# Patient Record
Sex: MALE | Race: WHITE | NOT HISPANIC OR LATINO | URBAN - METROPOLITAN AREA
[De-identification: names, ages, dates, MRNs, and addresses within clinical notes are randomized per-mention and may not be internally consistent; named-entity substitution may affect disease eponyms.]

---

## 2018-08-26 ENCOUNTER — INPATIENT (INPATIENT)
Facility: HOSPITAL | Age: 58
LOS: 1 days | Discharge: HOME | End: 2018-08-28
Attending: FAMILY MEDICINE | Admitting: FAMILY MEDICINE
Payer: COMMERCIAL

## 2018-08-26 VITALS — HEIGHT: 67 IN | WEIGHT: 240.08 LBS

## 2018-08-26 DIAGNOSIS — Z90.79 ACQUIRED ABSENCE OF OTHER GENITAL ORGAN(S): Chronic | ICD-10-CM

## 2018-08-26 LAB
ALBUMIN SERPL ELPH-MCNC: 3.9 G/DL — SIGNIFICANT CHANGE UP (ref 3.3–5)
ALP SERPL-CCNC: 59 U/L — SIGNIFICANT CHANGE UP (ref 40–120)
ALT FLD-CCNC: 33 U/L — SIGNIFICANT CHANGE UP (ref 12–78)
ANION GAP SERPL CALC-SCNC: 7 MMOL/L — SIGNIFICANT CHANGE UP (ref 5–17)
AST SERPL-CCNC: 22 U/L — SIGNIFICANT CHANGE UP (ref 15–37)
BILIRUB SERPL-MCNC: 0.6 MG/DL — SIGNIFICANT CHANGE UP (ref 0.2–1.2)
BUN SERPL-MCNC: 24 MG/DL — HIGH (ref 7–23)
CALCIUM SERPL-MCNC: 9.1 MG/DL — SIGNIFICANT CHANGE UP (ref 8.5–10.1)
CHLORIDE SERPL-SCNC: 106 MMOL/L — SIGNIFICANT CHANGE UP (ref 96–108)
CO2 SERPL-SCNC: 27 MMOL/L — SIGNIFICANT CHANGE UP (ref 22–31)
CREAT SERPL-MCNC: 1.29 MG/DL — SIGNIFICANT CHANGE UP (ref 0.5–1.3)
D DIMER BLD IA.RAPID-MCNC: <150 NG/ML DDU — SIGNIFICANT CHANGE UP
GLUCOSE SERPL-MCNC: 91 MG/DL — SIGNIFICANT CHANGE UP (ref 70–99)
HCT VFR BLD CALC: 46 % — SIGNIFICANT CHANGE UP (ref 39–50)
HGB BLD-MCNC: 15.8 G/DL — SIGNIFICANT CHANGE UP (ref 13–17)
INR BLD: 1.04 RATIO — SIGNIFICANT CHANGE UP (ref 0.88–1.16)
MCHC RBC-ENTMCNC: 30.9 PG — SIGNIFICANT CHANGE UP (ref 27–34)
MCHC RBC-ENTMCNC: 34.3 GM/DL — SIGNIFICANT CHANGE UP (ref 32–36)
MCV RBC AUTO: 90 FL — SIGNIFICANT CHANGE UP (ref 80–100)
NRBC # BLD: 0 /100 WBCS — SIGNIFICANT CHANGE UP (ref 0–0)
PLATELET # BLD AUTO: 206 K/UL — SIGNIFICANT CHANGE UP (ref 150–400)
POTASSIUM SERPL-MCNC: 4.4 MMOL/L — SIGNIFICANT CHANGE UP (ref 3.5–5.3)
POTASSIUM SERPL-SCNC: 4.4 MMOL/L — SIGNIFICANT CHANGE UP (ref 3.5–5.3)
PROT SERPL-MCNC: 7.5 GM/DL — SIGNIFICANT CHANGE UP (ref 6–8.3)
PROTHROM AB SERPL-ACNC: 11.2 SEC — SIGNIFICANT CHANGE UP (ref 9.8–12.7)
RBC # BLD: 5.11 M/UL — SIGNIFICANT CHANGE UP (ref 4.2–5.8)
RBC # FLD: 12 % — SIGNIFICANT CHANGE UP (ref 10.3–14.5)
SODIUM SERPL-SCNC: 140 MMOL/L — SIGNIFICANT CHANGE UP (ref 135–145)
TROPONIN I SERPL-MCNC: 0.17 NG/ML — HIGH (ref 0.01–0.04)
WBC # BLD: 7.21 K/UL — SIGNIFICANT CHANGE UP (ref 3.8–10.5)
WBC # FLD AUTO: 7.21 K/UL — SIGNIFICANT CHANGE UP (ref 3.8–10.5)

## 2018-08-26 PROCEDURE — 71045 X-RAY EXAM CHEST 1 VIEW: CPT | Mod: 26

## 2018-08-26 PROCEDURE — 93010 ELECTROCARDIOGRAM REPORT: CPT

## 2018-08-26 PROCEDURE — 99285 EMERGENCY DEPT VISIT HI MDM: CPT

## 2018-08-26 RX ORDER — METOPROLOL TARTRATE 50 MG
25 TABLET ORAL DAILY
Qty: 0 | Refills: 0 | Status: DISCONTINUED | OUTPATIENT
Start: 2018-08-26 | End: 2018-08-28

## 2018-08-26 RX ORDER — ATORVASTATIN CALCIUM 80 MG/1
40 TABLET, FILM COATED ORAL AT BEDTIME
Qty: 0 | Refills: 0 | Status: DISCONTINUED | OUTPATIENT
Start: 2018-08-26 | End: 2018-08-28

## 2018-08-26 RX ORDER — CLOPIDOGREL BISULFATE 75 MG/1
75 TABLET, FILM COATED ORAL DAILY
Qty: 0 | Refills: 0 | Status: DISCONTINUED | OUTPATIENT
Start: 2018-08-27 | End: 2018-08-28

## 2018-08-26 RX ORDER — CLOPIDOGREL BISULFATE 75 MG/1
300 TABLET, FILM COATED ORAL ONCE
Qty: 0 | Refills: 0 | Status: COMPLETED | OUTPATIENT
Start: 2018-08-26 | End: 2018-08-26

## 2018-08-26 RX ORDER — SODIUM CHLORIDE 9 MG/ML
1000 INJECTION INTRAMUSCULAR; INTRAVENOUS; SUBCUTANEOUS
Qty: 0 | Refills: 0 | Status: DISCONTINUED | OUTPATIENT
Start: 2018-08-26 | End: 2018-08-28

## 2018-08-26 RX ORDER — THYROID 120 MG
1 TABLET ORAL
Qty: 0 | Refills: 0 | COMMUNITY

## 2018-08-26 RX ORDER — ASPIRIN/CALCIUM CARB/MAGNESIUM 324 MG
81 TABLET ORAL DAILY
Qty: 0 | Refills: 0 | Status: DISCONTINUED | OUTPATIENT
Start: 2018-08-27 | End: 2018-08-28

## 2018-08-26 RX ORDER — ASPIRIN/CALCIUM CARB/MAGNESIUM 324 MG
162 TABLET ORAL ONCE
Qty: 0 | Refills: 0 | Status: COMPLETED | OUTPATIENT
Start: 2018-08-26 | End: 2018-08-26

## 2018-08-26 RX ORDER — ACETAMINOPHEN 500 MG
650 TABLET ORAL EVERY 6 HOURS
Qty: 0 | Refills: 0 | Status: DISCONTINUED | OUTPATIENT
Start: 2018-08-26 | End: 2018-08-28

## 2018-08-26 RX ORDER — ESCITALOPRAM OXALATE 10 MG/1
1 TABLET, FILM COATED ORAL
Qty: 0 | Refills: 0 | COMMUNITY

## 2018-08-26 RX ADMIN — SODIUM CHLORIDE 75 MILLILITER(S): 9 INJECTION INTRAMUSCULAR; INTRAVENOUS; SUBCUTANEOUS at 23:47

## 2018-08-26 RX ADMIN — CLOPIDOGREL BISULFATE 300 MILLIGRAM(S): 75 TABLET, FILM COATED ORAL at 21:18

## 2018-08-26 RX ADMIN — Medication 25 MILLIGRAM(S): at 21:18

## 2018-08-26 RX ADMIN — ATORVASTATIN CALCIUM 40 MILLIGRAM(S): 80 TABLET, FILM COATED ORAL at 23:48

## 2018-08-26 RX ADMIN — Medication 162 MILLIGRAM(S): at 19:38

## 2018-08-26 NOTE — ED PROVIDER NOTE - CARDIAC, MLM
Normal rate, regular rhythm.  Heart sounds S1, S2.  No murmurs, rubs or gallops. BL symmetrical radial pulses

## 2018-08-26 NOTE — H&P ADULT - NSHPREVIEWOFSYSTEMS_GEN_ALL_CORE
REVIEW OF SYSTEMS:    CONSTITUTIONAL: No weakness, fevers or chills  EYES/ENT: No visual changes;  No vertigo or throat pain   NECK: No pain or stiffness  RESPIRATORY: No cough, wheezing, hemoptysis; No shortness of breath  CARDIOVASCULAR: + chest pain, no  palpitations  GASTROINTESTINAL: No abdominal or epigastric pain. No nausea, vomiting, or hematemesis; No diarrhea or constipation. No melena or hematochezia.  GENITOURINARY: No dysuria, frequency or hematuria  NEUROLOGICAL: No numbness or weakness  SKIN: No itching, burning, rashes, or lesions   All other review of systems is negative unless indicated above.

## 2018-08-26 NOTE — H&P ADULT - ASSESSMENT
58 y/o male with PMHx of HTN, low testosterone level, BPH presenting to the  with  c/o intermittent episodes of CP. Patient reports substernal CP which was happening when he was walking the dog, pressure like, radiating to the left arm,  on and off for last 4 days, relived by rest.  Chest pain was lasted 20 minutes first but today was 2 hour.  Patient decided to go to ED.  Notes that sx began 8/23 while he was walking with his dog, sx returned the next day while walking the dog, sx returned again today while sitting at his computer.  Sx today lasted 2 hours.  Denies dizziness, palpitations, headaches, nausea, vomiting, abdominal pain, numbness or weakness. He did not seen PCP in 5-6 years ago, at [facility]. Went to urgent care where EKG was performed and was sent to ED, given 2 ASA at [facility].  Patient reports taking advil for pain with some relief.      In ED -  T(F): 98.4 , Max: 98.4 HR: 75  (60 - 75) BP: 164/102 (146/89 - 164/104) RR: 16  (16 - 18) SpO2: 99%  (98% - 100%) EKG - sinus 58, T wave inversion in III, flat T wave in AVF  troponin 0.166  Cr 1.29, BUN 24, d-dimers neg s/p ASA, plavix load     * Chest pain suspected NSTEMI   - tele  - serial troponin, EKG  - 2 d echo  - check lipid profile, TSH, A1C  - cardiology consult  - DAPT, statins, BB  - possible cath tomorrow  - NPO from midnight     * HTN, uncontrolled  - BB   - monitor  - may need addition of ACEi  if renal function improves     * SOILA  - IV fluids  - repeat Cr in am    DVT proph - IPC

## 2018-08-26 NOTE — ED ADULT NURSE NOTE - NSIMPLEMENTINTERV_GEN_ALL_ED
Implemented All Universal Safety Interventions:  [place] to call system. Call bell, personal items and telephone within reach. Instruct patient to call for assistance. Room bathroom lighting operational. Non-slip footwear when patient is off stretcher. Physically safe environment: no spills, clutter or unnecessary equipment. Stretcher in lowest position, wheels locked, appropriate side rails in place.

## 2018-08-26 NOTE — ED STATDOCS - PROGRESS NOTE DETAILS
Raymond ROMAN for ED Attending Dr. Balbuena: 56 y/o male with PMHx of presents to the ED c/o CP described as tightness x4 days. +numbness radiating down LUE. SX resolved in ED. Notes that sx began while he was walking with his dog. Last f/u with PCP 5-6 years ago. Follows with Leroy castaneda. Went to urgent care where EKG was performed and was sent to ED. Will send pt to the Main ED for further evaluation.

## 2018-08-26 NOTE — ED PROVIDER NOTE - OBJECTIVE STATEMENT
58 y/o male with no PMHx presenting to the ED c/o intermittent episodes of CP described as tightness, with mild SOB x4 days. Sx resolved in ED, currently asymptomatic. Notes that sx began 8/23 while he was walking with his dog, sx returned the next day while walking the dog, sx returned again today while sitting at his computer. Sx today lasted approx 1-2 hours. Denies dizziness, n/v. Last f/u with PCP 5-6 years ago. Follows with Leroy castaneda. Went to urgent care where EKG was performed and was sent to ED, given 2 ASA at [facility]. Travelled to [place] this past weekend. States ASA relieves sx. Nonsmoker. FHx of mother with stents placed in late 50s.

## 2018-08-26 NOTE — ED ADULT NURSE REASSESSMENT NOTE - NSIMPLEMENTINTERV_GEN_ALL_ED
Implemented All Universal Safety Interventions:  [place] to call system. Call bell, personal items and telephone within reach. Instruct patient to call for assistance. Room bathroom lighting operational. Non-slip footwear when patient is off stretcher. Physically safe environment: no spills, clutter or unnecessary equipment. Stretcher in lowest position, wheels locked, appropriate side rails in place.

## 2018-08-26 NOTE — ED ADULT NURSE REASSESSMENT NOTE - NS ED NURSE REASSESS COMMENT FT1
Assumed care of patient from RAJINDER Cowan. Patient resting comfortably in bed. Safety and comfort maintained. Will continue to monitor.

## 2018-08-26 NOTE — H&P ADULT - HISTORY OF PRESENT ILLNESS
56 y/o male with PMHx of HTN, low testosterone level, BPH presenting to the  with  c/o intermittent episodes of CP. Patient reports substernal CP which was happening when he was walking the dog, pressure like, radiating to the left arm,  on and off for last 4 days, relived by rest.  Chest pain was lasted 20 minutes first but today was 2 hour.  Patient decided to go to ED.  Notes that sx began 8/23 while he was walking with his dog, sx returned the next day while walking the dog, sx returned again today while sitting at his computer.  Sx today lasted 2 hours.  Denies dizziness, palpitations, headaches, nausea, vomiting, abdominal pain, numbness or weakness. He did not seen PCP in 5-6 years ago, at [facility]. Went to urgent care where EKG was performed and was sent to ED, given 2 ASA at [facility].  Patient reports taking advil for pain with some relief.      In ED -  T(F): 98.4 , Max: 98.4 HR: 75  (60 - 75) BP: 164/102 (146/89 - 164/104) RR: 16  (16 - 18) SpO2: 99%  (98% - 100%) EKG - sinus 58, T wave inversion in III, flat T wave in AVF  troponin 0.166  Cr 1.29, BUN 24, d-dimers neg s/p ASA, plavix load

## 2018-08-26 NOTE — PATIENT PROFILE ADULT. - NS TRANSFER PATIENT BELONGINGS
Electronic Device (specify)/lap top/Clothing Clothing/Electronic Device (specify)/lap top [employer] think pad and , [employer] android, , laptop bag

## 2018-08-26 NOTE — PATIENT PROFILE ADULT. - TEACHING/LEARNING LEARNING PREFERENCES
computer/internet/individual instruction/verbal instruction/skill demonstration/video/written material

## 2018-08-27 LAB
ANION GAP SERPL CALC-SCNC: 7 MMOL/L — SIGNIFICANT CHANGE UP (ref 5–17)
BASOPHILS # BLD AUTO: 0.02 K/UL — SIGNIFICANT CHANGE UP (ref 0–0.2)
BASOPHILS NFR BLD AUTO: 0.3 % — SIGNIFICANT CHANGE UP (ref 0–2)
BLD GP AB SCN SERPL QL: SIGNIFICANT CHANGE UP
BUN SERPL-MCNC: 19 MG/DL — SIGNIFICANT CHANGE UP (ref 7–23)
CALCIUM SERPL-MCNC: 8.5 MG/DL — SIGNIFICANT CHANGE UP (ref 8.5–10.1)
CHLORIDE SERPL-SCNC: 106 MMOL/L — SIGNIFICANT CHANGE UP (ref 96–108)
CHOLEST SERPL-MCNC: 214 MG/DL — HIGH (ref 10–199)
CK SERPL-CCNC: 109 U/L — SIGNIFICANT CHANGE UP (ref 26–308)
CK SERPL-CCNC: 123 U/L — SIGNIFICANT CHANGE UP (ref 26–308)
CO2 SERPL-SCNC: 26 MMOL/L — SIGNIFICANT CHANGE UP (ref 22–31)
CREAT SERPL-MCNC: 1.15 MG/DL — SIGNIFICANT CHANGE UP (ref 0.5–1.3)
EOSINOPHIL # BLD AUTO: 0.12 K/UL — SIGNIFICANT CHANGE UP (ref 0–0.5)
EOSINOPHIL NFR BLD AUTO: 1.9 % — SIGNIFICANT CHANGE UP (ref 0–6)
GLUCOSE SERPL-MCNC: 97 MG/DL — SIGNIFICANT CHANGE UP (ref 70–99)
HBA1C BLD-MCNC: 5.5 % — SIGNIFICANT CHANGE UP (ref 4–5.6)
HCT VFR BLD CALC: 46.3 % — SIGNIFICANT CHANGE UP (ref 39–50)
HDLC SERPL-MCNC: 35 MG/DL — LOW
HGB BLD-MCNC: 16.1 G/DL — SIGNIFICANT CHANGE UP (ref 13–17)
IMM GRANULOCYTES NFR BLD AUTO: 0.3 % — SIGNIFICANT CHANGE UP (ref 0–1.5)
LIPID PNL WITH DIRECT LDL SERPL: 140 MG/DL — HIGH
LYMPHOCYTES # BLD AUTO: 1.98 K/UL — SIGNIFICANT CHANGE UP (ref 1–3.3)
LYMPHOCYTES # BLD AUTO: 31.2 % — SIGNIFICANT CHANGE UP (ref 13–44)
MCHC RBC-ENTMCNC: 31 PG — SIGNIFICANT CHANGE UP (ref 27–34)
MCHC RBC-ENTMCNC: 34.8 GM/DL — SIGNIFICANT CHANGE UP (ref 32–36)
MCV RBC AUTO: 89.2 FL — SIGNIFICANT CHANGE UP (ref 80–100)
MONOCYTES # BLD AUTO: 0.59 K/UL — SIGNIFICANT CHANGE UP (ref 0–0.9)
MONOCYTES NFR BLD AUTO: 9.3 % — SIGNIFICANT CHANGE UP (ref 2–14)
NEUTROPHILS # BLD AUTO: 3.61 K/UL — SIGNIFICANT CHANGE UP (ref 1.8–7.4)
NEUTROPHILS NFR BLD AUTO: 57 % — SIGNIFICANT CHANGE UP (ref 43–77)
NRBC # BLD: 0 /100 WBCS — SIGNIFICANT CHANGE UP (ref 0–0)
NT-PROBNP SERPL-SCNC: 100 PG/ML — SIGNIFICANT CHANGE UP (ref 0–125)
PLATELET # BLD AUTO: 199 K/UL — SIGNIFICANT CHANGE UP (ref 150–400)
POTASSIUM SERPL-MCNC: 3.9 MMOL/L — SIGNIFICANT CHANGE UP (ref 3.5–5.3)
POTASSIUM SERPL-SCNC: 3.9 MMOL/L — SIGNIFICANT CHANGE UP (ref 3.5–5.3)
RBC # BLD: 5.19 M/UL — SIGNIFICANT CHANGE UP (ref 4.2–5.8)
RBC # FLD: 12.1 % — SIGNIFICANT CHANGE UP (ref 10.3–14.5)
SODIUM SERPL-SCNC: 139 MMOL/L — SIGNIFICANT CHANGE UP (ref 135–145)
TOTAL CHOLESTEROL/HDL RATIO MEASUREMENT: 6.1 RATIO — SIGNIFICANT CHANGE UP (ref 3.4–9.6)
TRIGL SERPL-MCNC: 197 MG/DL — HIGH (ref 10–149)
TROPONIN I SERPL-MCNC: 0.13 NG/ML — HIGH (ref 0.01–0.04)
TROPONIN I SERPL-MCNC: 0.18 NG/ML — HIGH (ref 0.01–0.04)
TSH SERPL-MCNC: 2.61 UU/ML — SIGNIFICANT CHANGE UP (ref 0.34–4.82)
TYPE + AB SCN PNL BLD: SIGNIFICANT CHANGE UP
WBC # BLD: 6.34 K/UL — SIGNIFICANT CHANGE UP (ref 3.8–10.5)
WBC # FLD AUTO: 6.34 K/UL — SIGNIFICANT CHANGE UP (ref 3.8–10.5)

## 2018-08-27 PROCEDURE — 93010 ELECTROCARDIOGRAM REPORT: CPT | Mod: 76

## 2018-08-27 PROCEDURE — 93306 TTE W/DOPPLER COMPLETE: CPT | Mod: 26

## 2018-08-27 RX ORDER — SODIUM CHLORIDE 9 MG/ML
1000 INJECTION INTRAMUSCULAR; INTRAVENOUS; SUBCUTANEOUS
Qty: 0 | Refills: 0 | Status: COMPLETED | OUTPATIENT
Start: 2018-08-27 | End: 2018-08-27

## 2018-08-27 RX ADMIN — SODIUM CHLORIDE 100 MILLILITER(S): 9 INJECTION INTRAMUSCULAR; INTRAVENOUS; SUBCUTANEOUS at 13:33

## 2018-08-27 RX ADMIN — SODIUM CHLORIDE 100 MILLILITER(S): 9 INJECTION INTRAMUSCULAR; INTRAVENOUS; SUBCUTANEOUS at 22:41

## 2018-08-27 RX ADMIN — Medication 81 MILLIGRAM(S): at 13:32

## 2018-08-27 RX ADMIN — ATORVASTATIN CALCIUM 40 MILLIGRAM(S): 80 TABLET, FILM COATED ORAL at 22:42

## 2018-08-27 RX ADMIN — CLOPIDOGREL BISULFATE 75 MILLIGRAM(S): 75 TABLET, FILM COATED ORAL at 13:32

## 2018-08-27 RX ADMIN — Medication 650 MILLIGRAM(S): at 11:08

## 2018-08-27 NOTE — PROGRESS NOTE ADULT - SUBJECTIVE AND OBJECTIVE BOX
Cardiology NP     Patient is a 57y old  Male who presents with a chief complaint of left sided chest pain (26 Aug 2018 23:47)      HPI:  56 y/o male with PMHx of HTN, low testosterone level, BPH presenting to the  with  c/o intermittent episodes of CP. Patient reports substernal CP which was happening when he was walking the dog, pressure like, radiating to the left arm,  on and off for last 4 days, relived by rest.  troponin 0.166  Cr 1.29,  s/p ASA, plavix load (26 Aug 2018 23:47)      PAST MEDICAL & SURGICAL HISTORY:  Low testosterone in male  Enlarged prostate  HTN (hypertension)  No pertinent past medical history  H/O prostatectomy      MEDICATIONS  (STANDING):  aspirin enteric coated 81 milliGRAM(s) Oral daily  atorvastatin 40 milliGRAM(s) Oral at bedtime  clopidogrel Tablet 75 milliGRAM(s) Oral daily  metoprolol succinate ER 25 milliGRAM(s) Oral daily  sodium chloride 0.9%. 1000 milliLiter(s) (75 mL/Hr) IV Continuous <Continuous>  sodium chloride 0.9%. 1000 milliLiter(s) (100 mL/Hr) IV Continuous <Continuous>    MEDICATIONS  (PRN):  acetaminophen   Tablet. 650 milliGRAM(s) Oral every 6 hours PRN Mild Pain (1 - 3)      Allergies    No Known Allergies    REVIEW OF SYSTEMS: As mentioned in HPI all others Negative     Vital Signs Last 24 Hrs  T(C): 36.2 (27 Aug 2018 11:25), Max: 37.1 (26 Aug 2018 23:40)  T(F): 97.2 (27 Aug 2018 11:25), Max: 98.8 (26 Aug 2018 23:40)  HR: 58 (27 Aug 2018 11:25) (58 - 75)  BP: 167/93 (27 Aug 2018 11:25) (131/78 - 167/93)  BP(mean): --  RR: 16 (27 Aug 2018 11:25) (16 - 18)  SpO2: 97% (27 Aug 2018 11:25) (95% - 100%)    PHYSICAL EXAM:  NERVOUS SYSTEM:  Alert & Oriented X3, Good concentration  CHEST/LUNG: Clear to auscultation bilaterally; No rales, rhonchi, wheezing, or rubs  HEART: Regular rate and rhythm; No murmurs, rubs, or gallops  ABDOMEN: Soft, Nontender, Nondistended; Bowel sounds present  EXTREMITIES:  2+ Peripheral Pulses, No clubbing, cyanosis, or edema  SKIN: right femoral cath site with Perclose device, site without bleeding or hematoma    LABS:                        16.1   6.34  )-----------( 199      ( 27 Aug 2018 07:20 )             46.3     08-27    139  |  106  |  19  ----------------------------<  97  3.9   |  26  |  1.15    Ca    8.5      27 Aug 2018 07:20    TPro  7.5  /  Alb  3.9  /  TBili  0.6  /  DBili  x   /  AST  22  /  ALT  33  /  AlkPhos  59  08-26    PT/INR - ( 26 Aug 2018 18:48 )   PT: 11.2 sec;   INR: 1.04 ratio

## 2018-08-27 NOTE — PACU DISCHARGE NOTE - COMMENTS
Report given to Sara ontiveros on cicu. right groin site tegaderm dsg clean dry and intact, no bleeding or hematoma. Pt denies pain on discomfort. safety maintained

## 2018-08-27 NOTE — CONSULT NOTE ADULT - SUBJECTIVE AND OBJECTIVE BOX
CHIEF COMPLAINT: Patient is a 57y old  Male who presents with a chief complaint of left sided chest pain (26 Aug 2018 23:47)      HPI:  58 y/o male with PMHx of HTN, low testosterone level, BPH presenting to the  with  c/o intermittent episodes of CP. Patient reports substernal CP which was happening when he was walking the dog, pressure like, radiating to the left arm,  on and off for last 4 days, relived by rest.  Chest pain was lasted 20 minutes first but today was 2 hour.  Patient decided to go to ED.  Notes that sx began 8/23 while he was walking with his dog, sx returned the next day while walking the dog, sx returned again today while sitting at his computer.  Sx today lasted 2 hours.  Denies dizziness, palpitations, headaches, nausea, vomiting, abdominal pain, numbness or weakness. He did not seen PCP in 5-6 years ago, at [facility]. Went to urgent care where EKG was performed and was sent to ED, given 2 ASA at [facility].  Patient reports taking advil for pain with some relief.      In ED -  T(F): 98.4 , Max: 98.4 HR: 75  (60 - 75) BP: 164/102 (146/89 - 164/104) RR: 16  (16 - 18) SpO2: 99%  (98% - 100%) EKG - sinus 58, T wave inversion in III, flat T wave in AVF  troponin 0.166  Cr 1.29, BUN 24, d-dimers neg s/p ASA, plavix load (26 Aug 2018 23:47)    8/27-patient with unstable angina; minimal troponin elevation.  Feeling ok in hospital    PMHx: PAST MEDICAL & SURGICAL HISTORY:  Low testosterone in male  Enlarged prostate  HTN (hypertension)  H/O prostatectomy        Soc Hx:       Allergies: Allergies    No Known Allergies    Intolerances          REVIEW OF SYSTEMS:    CONSTITUTIONAL: No weakness, fevers or chills  EYES/ENT: No visual changes;  No vertigo or throat pain   NECK: No pain or stiffness  RESPIRATORY: No cough, wheezing, hemoptysis; No shortness of breath  CARDIOVASCULAR: chest pain or palpitations  GASTROINTESTINAL: No abdominal or epigastric pain. No nausea, vomiting, or hematemesis; No diarrhea or constipation. No melena or hematochezia.  GENITOURINARY: No dysuria, frequency or hematuria  NEUROLOGICAL: No numbness or weakness  SKIN: No itching, burning, rashes, or lesions   All other review of systems is negative unless indicated above    Vital Signs Last 24 Hrs  T(C): 36.6 (27 Aug 2018 04:32), Max: 37.1 (26 Aug 2018 23:40)  T(F): 97.8 (27 Aug 2018 04:32), Max: 98.8 (26 Aug 2018 23:40)  HR: 65 (27 Aug 2018 04:32) (60 - 75)  BP: 131/78 (27 Aug 2018 04:32) (131/78 - 164/104)  BP(mean): --  RR: 16 (26 Aug 2018 23:40) (16 - 18)  SpO2: 95% (27 Aug 2018 04:32) (95% - 100%)    I&O's Summary      CAPILLARY BLOOD GLUCOSE          PHYSICAL EXAM:   Constitutional: NAD, awake and alert, well-developed  HEENT: PERR, EOMI, Normal Hearing, MMM  Neck: Soft and supple, No LAD, No JVD  Respiratory: Breath sounds are clear bilaterally, No wheezing, rales or rhonchi  Cardiovascular: S1 and S2, regular rate and rhythm, no Murmurs, gallops or rubs  Gastrointestinal: Bowel Sounds present, soft, nontender, nondistended, no guarding, no rebound  Extremities: No peripheral edema  Vascular: 2+ peripheral pulses  Neurological: A/O x 3, no focal deficits  Musculoskeletal: 5/5 strength b/l upper and lower extremities  Skin: No rashes    MEDICATIONS:  MEDICATIONS  (STANDING):  aspirin enteric coated 81 milliGRAM(s) Oral daily  atorvastatin 40 milliGRAM(s) Oral at bedtime  clopidogrel Tablet 75 milliGRAM(s) Oral daily  metoprolol succinate ER 25 milliGRAM(s) Oral daily  sodium chloride 0.9%. 1000 milliLiter(s) (75 mL/Hr) IV Continuous <Continuous>      LABS: All Labs Reviewed:                        16.1   6.34  )-----------( 199      ( 27 Aug 2018 07:20 )             46.3     08-27    139  |  106  |  19  ----------------------------<  97  3.9   |  26  |  1.15    Ca    8.5      27 Aug 2018 07:20    TPro  7.5  /  Alb  3.9  /  TBili  0.6  /  DBili  x   /  AST  22  /  ALT  33  /  AlkPhos  59  08-26    PT/INR - ( 26 Aug 2018 18:48 )   PT: 11.2 sec;   INR: 1.04 ratio           CARDIAC MARKERS ( 27 Aug 2018 07:20 )  0.127 ng/mL / x     / 109 U/L / x     / x      CARDIAC MARKERS ( 26 Aug 2018 23:29 )  0.181 ng/mL / x     / 123 U/L / x     / x      CARDIAC MARKERS ( 26 Aug 2018 18:48 )  0.166 ng/mL / x     / x     / x     / x          Serum Pro-Brain Natriuretic Peptide: 100 pg/mL (08-27 @ 07:20)    Blood Culture:   lipid profile       RADIOLOGY:    EKG:  sinus bradycardia, st changes in I, aVL, T inversions in III, aVF    Telemetry:    ECHO:

## 2018-08-27 NOTE — CONSULT NOTE ADULT - ASSESSMENT
patient with angina, elevated troponins and abnl EKG  1-ASA/Plavix with load.  Statin therapy.  Going for angiography  2-If BP elevated will need either ACE-I or beta blockers-very low dose if BP runs high; may be due to anxiety of cath

## 2018-08-27 NOTE — PROGRESS NOTE ADULT - ASSESSMENT
HPI:  56 y/o male with PMHx of HTN, low testosterone level, BPH presenting to the  with  c/o intermittent episodes of CP. Patient reports substernal CP which was happening when he was walking the dog, pressure like, radiating to the left arm,  on and off for last 4 days, relived by rest.  Chest pain was lasted 20 minutes first but today was 2 hour.   + troponin 0.166   BUN 24,  s/p ASA, plavix load (26 Aug 2018 23:47)      Patient now s/p angiogram  with PCI and PASHA to the RCA    - monitor in CICU  - IV hydration  - AM labs and EKG  - procedure, outcome and f/u care reviewed with patient/MD  - continue ASA/ Plavix  - continue statin/ B' blocker   - possible DC home tomorrow if stable  - f/u with MD in 4-7 days

## 2018-08-27 NOTE — PROGRESS NOTE ADULT - SUBJECTIVE AND OBJECTIVE BOX
56 y/o male with PMHx of HTN, low testosterone level, BPH presenting to the  with  c/o intermittent episodes of CP. Patient reports substernal CP which was happening when he was walking the dog, pressure like, radiating to the left arm,  on and off for last 4 days, relived by rest.  Chest pain was lasted 20 minutes first but today was 2 hour.  Patient decided to go to ED.  Notes that sx began 8/23 while he was walking with his dog, sx returned the next day while walking the dog, sx returned again today while sitting at his computer.  Sx today lasted 2 hours.  Denies dizziness, palpitations, headaches, nausea, vomiting, abdominal pain, numbness or weakness. He did not seen PCP in 5-6 years ago, at [facility]. Went to urgent care where EKG was performed and was sent to ED, given 2 ASA at [facility].  Patient reports taking advil for pain with some relief.      8.27: s/p cardiac cath and PCI of RCA, no cp, no sob            REVIEW OF SYSTEMS:    CONSTITUTIONAL: No weakness, No fevers or chills  ENT: No ear ache, No sorethroat  NECK: No pain, No stiffness  RESPIRATORY: No cough, No wheezing, No hemoptysis; No dyspnea  CARDIOVASCULAR: No chest pain, No palpitations  GASTROINTESTINAL: No abd pain, No nausea, No vomiting, No hematemesis, No diarrhea or constipation. No melena, No hematochezia.  GENITOURINARY: No dysuria, No  hematuria  NEUROLOGICAL: No diplopia, No paresthesia, No motor dysfunction  MUSCULOSKELETAL: No arthralgia, No myalgia  SKIN: No rashes, or lesions   PSYCH: no anxiety, no suicidal ideation    All other review of systems is negative unless indicated above    Vital Signs Last 24 Hrs  T(C): 36.2 (27 Aug 2018 11:25), Max: 37.1 (26 Aug 2018 23:40)  T(F): 97.2 (27 Aug 2018 11:25), Max: 98.8 (26 Aug 2018 23:40)  HR: 65 (27 Aug 2018 15:35) (53 - 75)  BP: 150/- (27 Aug 2018 15:35) (131/78 - 167/93)  BP(mean): --  RR: 16 (27 Aug 2018 15:35) (16 - 18)  SpO2: 97% (27 Aug 2018 15:35) (95% - 100%)    PHYSICAL EXAM:    GENERAL: NAD, Well nourished  HEENT:  NC/AT, EOMI, PERRLA, No scleral icterus, Moist mucous membranes  NECK: Supple, No JVD  CNS:  Alert & Oriented X3, Motor Strength 5/5 B/L upper and lower extremities; DTRs 2+ intact   LUNG: Normal Breath sounds, Clear to auscultation bilaterally, No rales, No rhonchi, No wheezing  HEART: RRR; No murmurs, No rubs  ABDOMEN: +BS, ST/ND/NT  GENITOURINARY: Voiding, Bladder not distended  EXTREMITIES:  2+ Peripheral Pulses, No clubbing, No cyanosis, No tibial edema  MUSCULOSKELTAL: Joints normal ROM, No TTP, No effusion  VAGINAL: deferred  SKIN: no rashes  RECTAL: deferred, not indicated  BREAST: deferred                          16.1   6.34  )-----------( 199      ( 27 Aug 2018 07:20 )             46.3     08-27    139  |  106  |  19  ----------------------------<  97  3.9   |  26  |  1.15    Ca    8.5      27 Aug 2018 07:20    TPro  7.5  /  Alb  3.9  /  TBili  0.6  /  DBili  x   /  AST  22  /  ALT  33  /  AlkPhos  59  08-26    Vancomycin levels:   Cultures:     MEDICATIONS  (STANDING):  aspirin enteric coated 81 milliGRAM(s) Oral daily  atorvastatin 40 milliGRAM(s) Oral at bedtime  clopidogrel Tablet 75 milliGRAM(s) Oral daily  metoprolol succinate ER 25 milliGRAM(s) Oral daily  sodium chloride 0.9%. 1000 milliLiter(s) (75 mL/Hr) IV Continuous <Continuous>  sodium chloride 0.9%. 1000 milliLiter(s) (100 mL/Hr) IV Continuous <Continuous>    MEDICATIONS  (PRN):  acetaminophen   Tablet. 650 milliGRAM(s) Oral every 6 hours PRN Mild Pain (1 - 3)      all labs reviewed  all imaging reviewed    Assessment and Plan:    1. NSTEMI  s/p PCI of RCA  ASA/Plavix/Lopressor/Statins    2. Htn: stable

## 2018-08-28 ENCOUNTER — TRANSCRIPTION ENCOUNTER (OUTPATIENT)
Age: 58
End: 2018-08-28

## 2018-08-28 VITALS — HEART RATE: 76 BPM

## 2018-08-28 LAB
ANION GAP SERPL CALC-SCNC: 7 MMOL/L — SIGNIFICANT CHANGE UP (ref 5–17)
BASOPHILS # BLD AUTO: 0.05 K/UL — SIGNIFICANT CHANGE UP (ref 0–0.2)
BASOPHILS NFR BLD AUTO: 0.6 % — SIGNIFICANT CHANGE UP (ref 0–2)
BUN SERPL-MCNC: 19 MG/DL — SIGNIFICANT CHANGE UP (ref 7–23)
CALCIUM SERPL-MCNC: 8.6 MG/DL — SIGNIFICANT CHANGE UP (ref 8.5–10.1)
CHLORIDE SERPL-SCNC: 107 MMOL/L — SIGNIFICANT CHANGE UP (ref 96–108)
CO2 SERPL-SCNC: 25 MMOL/L — SIGNIFICANT CHANGE UP (ref 22–31)
CREAT SERPL-MCNC: 1.12 MG/DL — SIGNIFICANT CHANGE UP (ref 0.5–1.3)
EOSINOPHIL # BLD AUTO: 0.14 K/UL — SIGNIFICANT CHANGE UP (ref 0–0.5)
EOSINOPHIL NFR BLD AUTO: 1.7 % — SIGNIFICANT CHANGE UP (ref 0–6)
GLUCOSE SERPL-MCNC: 102 MG/DL — HIGH (ref 70–99)
HCT VFR BLD CALC: 46.5 % — SIGNIFICANT CHANGE UP (ref 39–50)
HGB BLD-MCNC: 15.8 G/DL — SIGNIFICANT CHANGE UP (ref 13–17)
IMM GRANULOCYTES NFR BLD AUTO: 0.2 % — SIGNIFICANT CHANGE UP (ref 0–1.5)
LYMPHOCYTES # BLD AUTO: 2.04 K/UL — SIGNIFICANT CHANGE UP (ref 1–3.3)
LYMPHOCYTES # BLD AUTO: 25.2 % — SIGNIFICANT CHANGE UP (ref 13–44)
MCHC RBC-ENTMCNC: 30.3 PG — SIGNIFICANT CHANGE UP (ref 27–34)
MCHC RBC-ENTMCNC: 34 GM/DL — SIGNIFICANT CHANGE UP (ref 32–36)
MCV RBC AUTO: 89.3 FL — SIGNIFICANT CHANGE UP (ref 80–100)
MONOCYTES # BLD AUTO: 0.75 K/UL — SIGNIFICANT CHANGE UP (ref 0–0.9)
MONOCYTES NFR BLD AUTO: 9.3 % — SIGNIFICANT CHANGE UP (ref 2–14)
NEUTROPHILS # BLD AUTO: 5.08 K/UL — SIGNIFICANT CHANGE UP (ref 1.8–7.4)
NEUTROPHILS NFR BLD AUTO: 63 % — SIGNIFICANT CHANGE UP (ref 43–77)
NRBC # BLD: 0 /100 WBCS — SIGNIFICANT CHANGE UP (ref 0–0)
PLATELET # BLD AUTO: 201 K/UL — SIGNIFICANT CHANGE UP (ref 150–400)
POTASSIUM SERPL-MCNC: 4.2 MMOL/L — SIGNIFICANT CHANGE UP (ref 3.5–5.3)
POTASSIUM SERPL-SCNC: 4.2 MMOL/L — SIGNIFICANT CHANGE UP (ref 3.5–5.3)
RBC # BLD: 5.21 M/UL — SIGNIFICANT CHANGE UP (ref 4.2–5.8)
RBC # FLD: 12.1 % — SIGNIFICANT CHANGE UP (ref 10.3–14.5)
SODIUM SERPL-SCNC: 139 MMOL/L — SIGNIFICANT CHANGE UP (ref 135–145)
WBC # BLD: 8.08 K/UL — SIGNIFICANT CHANGE UP (ref 3.8–10.5)
WBC # FLD AUTO: 8.08 K/UL — SIGNIFICANT CHANGE UP (ref 3.8–10.5)

## 2018-08-28 PROCEDURE — 93010 ELECTROCARDIOGRAM REPORT: CPT

## 2018-08-28 RX ORDER — METOPROLOL TARTRATE 50 MG
1 TABLET ORAL
Qty: 31 | Refills: 1 | OUTPATIENT
Start: 2018-08-28 | End: 2018-10-28

## 2018-08-28 RX ORDER — CLOPIDOGREL BISULFATE 75 MG/1
1 TABLET, FILM COATED ORAL
Qty: 90 | Refills: 3 | OUTPATIENT
Start: 2018-08-28 | End: 2019-08-22

## 2018-08-28 RX ORDER — ATORVASTATIN CALCIUM 80 MG/1
1 TABLET, FILM COATED ORAL
Qty: 31 | Refills: 1 | OUTPATIENT
Start: 2018-08-28 | End: 2018-10-28

## 2018-08-28 RX ORDER — ASPIRIN/CALCIUM CARB/MAGNESIUM 324 MG
1 TABLET ORAL
Qty: 0 | Refills: 0 | COMMUNITY
Start: 2018-08-28

## 2018-08-28 RX ADMIN — Medication 81 MILLIGRAM(S): at 09:33

## 2018-08-28 RX ADMIN — CLOPIDOGREL BISULFATE 75 MILLIGRAM(S): 75 TABLET, FILM COATED ORAL at 09:33

## 2018-08-28 RX ADMIN — Medication 25 MILLIGRAM(S): at 06:55

## 2018-08-28 NOTE — DISCHARGE NOTE ADULT - PATIENT PORTAL LINK FT
You can access the [facility] Patient Portal, offered by [facility], by registering with the following website: [email or web address]

## 2018-08-28 NOTE — DISCHARGE NOTE ADULT - MEDICATION SUMMARY - MEDICATIONS TO TAKE
I will START or STAY ON the medications listed below when I get home from the hospital:    Advil 200 mg oral tablet  -- 1 tab(s) by mouth every 6 hours, As Needed  -- Indication: For Pain    aspirin 81 mg oral delayed release tablet  -- 1 tab(s) by mouth once a day  -- Indication: For CAD    atorvastatin 40 mg oral tablet  -- 1 tab(s) by mouth once a day (at bedtime)  -- Indication: For HLD    clopidogrel 75 mg oral tablet  -- 1 tab(s) by mouth once a day  -- Indication: For stent    metoprolol succinate 25 mg oral tablet, extended release  -- 1 tab(s) by mouth once a day  -- Indication: For HTN (hypertension)

## 2018-08-28 NOTE — DISCHARGE NOTE ADULT - CARE PROVIDER_API CALL
Chaparro Cornelius), Cardiovascular Disease; Internal Medicine  [address] [place] [address]  Phone: [phone]  Fax: [phone]

## 2018-08-28 NOTE — PROGRESS NOTE ADULT - ASSESSMENT
HPI:  58 y/o male with PMHx of HTN, low testosterone level, BPH presenting to the  with  c/o intermittent episodes of CP. Patient reports substernal CP which was happening when he was walking the dog, pressure like, radiating to the left arm,  on and off for last 4 days, relived by rest.  Chest pain was lasted 20 minutes first but today was 2 hour.        Patient now s/p angiogram with PCI and PASHA to the RCA    - AM labs and EKG reviewed  - procedure, outcome and f/u care reviewed with patient/MD  - continue ASA/ Plavix  - continue statin/ b' blocker  - possible DC home today  - f/u with Dr Cornelius in 4-7 days of discharge

## 2018-08-28 NOTE — PROGRESS NOTE ADULT - SUBJECTIVE AND OBJECTIVE BOX
Cardiology NP     Patient is a 57y old  Male who presents with a chief complaint of left sided chest pain (28 Aug 2018 07:29)      HPI:  56 y/o male with PMHx of HTN, low testosterone level, BPH presenting to the  with  c/o intermittent episodes of CP. Patient reports substernal CP which was happening when he was walking the dog, pressure like, radiating to the left arm,  on and off for last 4 days, relived by rest.  Chest pain was lasted 20 minutes first but today was 2 hour.      PAST MEDICAL & SURGICAL HISTORY:  Low testosterone in male  Enlarged prostate  HTN (hypertension)  No pertinent past medical history  H/O prostatectomy      MEDICATIONS  (STANDING):  aspirin enteric coated 81 milliGRAM(s) Oral daily  atorvastatin 40 milliGRAM(s) Oral at bedtime  clopidogrel Tablet 75 milliGRAM(s) Oral daily  metoprolol succinate ER 25 milliGRAM(s) Oral daily  sodium chloride 0.9%. 1000 milliLiter(s) (75 mL/Hr) IV Continuous <Continuous>    MEDICATIONS  (PRN):  acetaminophen   Tablet. 650 milliGRAM(s) Oral every 6 hours PRN Mild Pain (1 - 3)      Allergies    No Known Allergies      REVIEW OF SYSTEMS: As mentioned in HPI all others Negative     Vital Signs Last 24 Hrs  T(C): 36.3 (28 Aug 2018 05:43), Max: 37.1 (27 Aug 2018 11:23)  T(F): 97.4 (28 Aug 2018 05:43), Max: 98.7 (27 Aug 2018 11:23)  HR: 62 (28 Aug 2018 04:00) (53 - 76)  BP: 140/85 (28 Aug 2018 04:00) (119/57 - 167/93)  BP(mean): 72 (28 Aug 2018 02:00) (72 - 109)  RR: 14 (28 Aug 2018 04:00) (13 - 27)  SpO2: 96% (28 Aug 2018 04:00) (95% - 98%)    PHYSICAL EXAM:  NERVOUS SYSTEM:  Alert & Oriented X3, Good concentration  CHEST/LUNG: Clear to auscultation bilaterally; No rales, rhonchi, wheezing, or rubs  HEART: Regular rate and rhythm; No murmurs, rubs, or gallops  ABDOMEN: Soft, Nontender, Nondistended; Bowel sounds present  EXTREMITIES:  2+ Peripheral Pulses, No clubbing, cyanosis, or edema  SKIN: right femoral cath site without bleeding or hematoma    LABS:                        15.8   8.08  )-----------( 201      ( 28 Aug 2018 04:59 )             46.5     08-28    139  |  107  |  19  ----------------------------<  102<H>  4.2   |  25  |  1.12    Ca    8.6      28 Aug 2018 04:59    TPro  7.5  /  Alb  3.9  /  TBili  0.6  /  DBili  x   /  AST  22  /  ALT  33  /  AlkPhos  59  08-26    PT/INR - ( 26 Aug 2018 18:48 )   PT: 11.2 sec;   INR: 1.04 ratio

## 2018-08-28 NOTE — DISCHARGE NOTE ADULT - CARE PLAN
Principal Discharge DX:	NSTEMI (non-ST elevated myocardial infarction)  Goal:	symptom free  Assessment and plan of treatment:	as per post cath

## 2018-08-30 DIAGNOSIS — I21.4 NON-ST ELEVATION (NSTEMI) MYOCARDIAL INFARCTION: ICD-10-CM

## 2018-08-30 DIAGNOSIS — I25.110 ATHEROSCLEROTIC HEART DISEASE OF NATIVE CORONARY ARTERY WITH UNSTABLE ANGINA PECTORIS: ICD-10-CM

## 2018-08-30 DIAGNOSIS — R07.9 CHEST PAIN, UNSPECIFIED: ICD-10-CM

## 2018-08-30 DIAGNOSIS — N40.0 BENIGN PROSTATIC HYPERPLASIA WITHOUT LOWER URINARY TRACT SYMPTOMS: ICD-10-CM

## 2018-08-30 DIAGNOSIS — I10 ESSENTIAL (PRIMARY) HYPERTENSION: ICD-10-CM

## 2018-10-29 ENCOUNTER — EMERGENCY (EMERGENCY)
Facility: HOSPITAL | Age: 58
LOS: 0 days | Discharge: HOME | End: 2018-10-29
Attending: EMERGENCY MEDICINE
Payer: COMMERCIAL

## 2018-10-29 VITALS
OXYGEN SATURATION: 96 % | DIASTOLIC BLOOD PRESSURE: 105 MMHG | HEIGHT: 66 IN | HEART RATE: 70 BPM | RESPIRATION RATE: 18 BRPM | TEMPERATURE: 98 F | WEIGHT: 220.02 LBS | SYSTOLIC BLOOD PRESSURE: 155 MMHG

## 2018-10-29 VITALS
OXYGEN SATURATION: 95 % | TEMPERATURE: 98 F | SYSTOLIC BLOOD PRESSURE: 135 MMHG | DIASTOLIC BLOOD PRESSURE: 95 MMHG | HEART RATE: 66 BPM | RESPIRATION RATE: 16 BRPM

## 2018-10-29 DIAGNOSIS — Z79.82 LONG TERM (CURRENT) USE OF ASPIRIN: ICD-10-CM

## 2018-10-29 DIAGNOSIS — Z95.5 PRESENCE OF CORONARY ANGIOPLASTY IMPLANT AND GRAFT: ICD-10-CM

## 2018-10-29 DIAGNOSIS — Z90.79 ACQUIRED ABSENCE OF OTHER GENITAL ORGAN(S): Chronic | ICD-10-CM

## 2018-10-29 DIAGNOSIS — R55 SYNCOPE AND COLLAPSE: ICD-10-CM

## 2018-10-29 DIAGNOSIS — N40.0 BENIGN PROSTATIC HYPERPLASIA WITHOUT LOWER URINARY TRACT SYMPTOMS: ICD-10-CM

## 2018-10-29 DIAGNOSIS — I10 ESSENTIAL (PRIMARY) HYPERTENSION: ICD-10-CM

## 2018-10-29 DIAGNOSIS — I25.10 ATHEROSCLEROTIC HEART DISEASE OF NATIVE CORONARY ARTERY WITHOUT ANGINA PECTORIS: ICD-10-CM

## 2018-10-29 PROBLEM — R79.89 OTHER SPECIFIED ABNORMAL FINDINGS OF BLOOD CHEMISTRY: Chronic | Status: ACTIVE | Noted: 2018-08-27

## 2018-10-29 LAB
ALBUMIN SERPL ELPH-MCNC: 3.5 G/DL — SIGNIFICANT CHANGE UP (ref 3.3–5)
ALP SERPL-CCNC: 65 U/L — SIGNIFICANT CHANGE UP (ref 40–120)
ALT FLD-CCNC: 29 U/L — SIGNIFICANT CHANGE UP (ref 12–78)
ANION GAP SERPL CALC-SCNC: 7 MMOL/L — SIGNIFICANT CHANGE UP (ref 5–17)
APTT BLD: 27.6 SEC — SIGNIFICANT CHANGE UP (ref 27.5–37.4)
AST SERPL-CCNC: 19 U/L — SIGNIFICANT CHANGE UP (ref 15–37)
BASOPHILS # BLD AUTO: 0.03 K/UL — SIGNIFICANT CHANGE UP (ref 0–0.2)
BASOPHILS NFR BLD AUTO: 0.4 % — SIGNIFICANT CHANGE UP (ref 0–2)
BILIRUB SERPL-MCNC: 0.7 MG/DL — SIGNIFICANT CHANGE UP (ref 0.2–1.2)
BUN SERPL-MCNC: 17 MG/DL — SIGNIFICANT CHANGE UP (ref 7–23)
CALCIUM SERPL-MCNC: 9.1 MG/DL — SIGNIFICANT CHANGE UP (ref 8.5–10.1)
CHLORIDE SERPL-SCNC: 108 MMOL/L — SIGNIFICANT CHANGE UP (ref 96–108)
CO2 SERPL-SCNC: 26 MMOL/L — SIGNIFICANT CHANGE UP (ref 22–31)
CREAT SERPL-MCNC: 1.36 MG/DL — HIGH (ref 0.5–1.3)
EOSINOPHIL # BLD AUTO: 0.11 K/UL — SIGNIFICANT CHANGE UP (ref 0–0.5)
EOSINOPHIL NFR BLD AUTO: 1.6 % — SIGNIFICANT CHANGE UP (ref 0–6)
GLUCOSE SERPL-MCNC: 123 MG/DL — HIGH (ref 70–99)
HCT VFR BLD CALC: 52.1 % — HIGH (ref 39–50)
HGB BLD-MCNC: 17.6 G/DL — HIGH (ref 13–17)
IMM GRANULOCYTES NFR BLD AUTO: 0.3 % — SIGNIFICANT CHANGE UP (ref 0–1.5)
INR BLD: 1.05 RATIO — SIGNIFICANT CHANGE UP (ref 0.88–1.16)
LYMPHOCYTES # BLD AUTO: 1.42 K/UL — SIGNIFICANT CHANGE UP (ref 1–3.3)
LYMPHOCYTES # BLD AUTO: 20.2 % — SIGNIFICANT CHANGE UP (ref 13–44)
MCHC RBC-ENTMCNC: 31.3 PG — SIGNIFICANT CHANGE UP (ref 27–34)
MCHC RBC-ENTMCNC: 33.8 GM/DL — SIGNIFICANT CHANGE UP (ref 32–36)
MCV RBC AUTO: 92.5 FL — SIGNIFICANT CHANGE UP (ref 80–100)
MONOCYTES # BLD AUTO: 0.48 K/UL — SIGNIFICANT CHANGE UP (ref 0–0.9)
MONOCYTES NFR BLD AUTO: 6.8 % — SIGNIFICANT CHANGE UP (ref 2–14)
NEUTROPHILS # BLD AUTO: 4.98 K/UL — SIGNIFICANT CHANGE UP (ref 1.8–7.4)
NEUTROPHILS NFR BLD AUTO: 70.7 % — SIGNIFICANT CHANGE UP (ref 43–77)
PLATELET # BLD AUTO: 204 K/UL — SIGNIFICANT CHANGE UP (ref 150–400)
POTASSIUM SERPL-MCNC: 3.8 MMOL/L — SIGNIFICANT CHANGE UP (ref 3.5–5.3)
POTASSIUM SERPL-SCNC: 3.8 MMOL/L — SIGNIFICANT CHANGE UP (ref 3.5–5.3)
PROT SERPL-MCNC: 6.9 GM/DL — SIGNIFICANT CHANGE UP (ref 6–8.3)
PROTHROM AB SERPL-ACNC: 11.4 SEC — SIGNIFICANT CHANGE UP (ref 9.8–12.7)
RBC # BLD: 5.63 M/UL — SIGNIFICANT CHANGE UP (ref 4.2–5.8)
RBC # FLD: 12.9 % — SIGNIFICANT CHANGE UP (ref 10.3–14.5)
SODIUM SERPL-SCNC: 141 MMOL/L — SIGNIFICANT CHANGE UP (ref 135–145)
TROPONIN I SERPL-MCNC: <0.015 NG/ML — SIGNIFICANT CHANGE UP (ref 0.01–0.04)
WBC # BLD: 7.04 K/UL — SIGNIFICANT CHANGE UP (ref 3.8–10.5)
WBC # FLD AUTO: 7.04 K/UL — SIGNIFICANT CHANGE UP (ref 3.8–10.5)

## 2018-10-29 PROCEDURE — 93010 ELECTROCARDIOGRAM REPORT: CPT

## 2018-10-29 PROCEDURE — 99284 EMERGENCY DEPT VISIT MOD MDM: CPT

## 2018-10-29 PROCEDURE — 71045 X-RAY EXAM CHEST 1 VIEW: CPT | Mod: 26

## 2018-10-29 RX ORDER — CLOPIDOGREL BISULFATE 75 MG/1
75 TABLET, FILM COATED ORAL DAILY
Qty: 0 | Refills: 0 | Status: DISCONTINUED | OUTPATIENT
Start: 2018-10-29 | End: 2018-10-29

## 2018-10-29 RX ORDER — SODIUM CHLORIDE 9 MG/ML
500 INJECTION INTRAMUSCULAR; INTRAVENOUS; SUBCUTANEOUS ONCE
Qty: 0 | Refills: 0 | Status: COMPLETED | OUTPATIENT
Start: 2018-10-29 | End: 2018-10-29

## 2018-10-29 RX ORDER — IBUPROFEN 200 MG
1 TABLET ORAL
Qty: 0 | Refills: 0 | COMMUNITY

## 2018-10-29 RX ORDER — METOPROLOL TARTRATE 50 MG
25 TABLET ORAL ONCE
Qty: 0 | Refills: 0 | Status: COMPLETED | OUTPATIENT
Start: 2018-10-29 | End: 2018-10-29

## 2018-10-29 RX ADMIN — SODIUM CHLORIDE 500 MILLILITER(S): 9 INJECTION INTRAMUSCULAR; INTRAVENOUS; SUBCUTANEOUS at 11:45

## 2018-10-29 RX ADMIN — Medication 25 MILLIGRAM(S): at 08:47

## 2018-10-29 RX ADMIN — CLOPIDOGREL BISULFATE 75 MILLIGRAM(S): 75 TABLET, FILM COATED ORAL at 08:47

## 2018-10-29 RX ADMIN — SODIUM CHLORIDE 500 MILLILITER(S): 9 INJECTION INTRAMUSCULAR; INTRAVENOUS; SUBCUTANEOUS at 10:43

## 2018-10-29 NOTE — ED PROVIDER NOTE - MEDICAL DECISION MAKING DETAILS
59 yo m with cad s/p 1 PASHA Aug 28 presents with near syncope.  ekg, cardiac monitoring, repeat troponins, d/w cardiology and reassess

## 2018-10-29 NOTE — ED ADULT NURSE NOTE - DRUG PRE-SCREENING (DAST -1)
[facility]     Biopsies taken and sent to the lab.  You have an ulcer inyour stomach.  Avoid aspirin and ibuprofen.  You should receive a report from the doctor's office within 2 weeks.       ESOPHAGOGASTRODUODENOSCOPY (EGD)  DIET:          · Start with soft diet and gradually resume usual diet.    WHAT TO EXPECT:    · Mild Abdominal discomfort, bloating and gas pains.      · A mild sore throat is possible and should disappear in a day or two.    · A tired feeling after the procedure due to the medications given to help you relax.    · Pathology results will be given to you by phone call and/or letter from doctor’s office.    PROBLEMS TO WATCH FOR--NOTIFY YOUR SURGEON IF YOU HAVE ANY OF THESE SYMPTOMS:    · Severe sore throat.  · Difficulty swallowing.  · Chest  Pain.  · Chills or temperature over 101 degrees.  · Severe abdominal pain or bloating.  · Vomiting blood or black colored liquid.      Discharge Instructions    Resume your regular medications.    During the recovery period from anesthetic/sedation (up to 24 hours), we advise the following, unless otherwise directed by your physician:    · Do not drink alcoholic beverages, including beer, for 24 hours and/or while taking pain medication.  · Do not drive a motor vehicle, operate machinery or power tools for 24 hours or while taking pain medication.  · Do not make any important decisions or sign any legal documents for 24 hours.  · Do not stay alone. Have a responsible party with you overnight.  · Get plenty of rest. It is normal to feel tired and nap frequently because of anesthetic or sedation medications given during surgery.  · Increase your diet slowly to normal. Start with liquids and gradually increase to solid food, unless otherwise directed. If you have had a local anesthetic you may resume your normal diet.    In case of an emergency,    1. Call your physician at [phone]  [facility]  2. Call the emergency room at your nearest hospital                Statement Selected

## 2018-10-29 NOTE — ED PROVIDER NOTE - OBJECTIVE STATEMENT
57 yo m c/o near syncope.  he has htn, cad s/p 1 PASHA 8/28 by dr henderson and juan.  pt woke up this morning and tried to go to the bathroom but felt lightheaded, dizzyness.  no chest pain or sob.  no vomiting or diarrhea. no precipitating, exacerbating or alleviating factors.  he did work extra hard yesterday lifting things and putting them in the car

## 2018-10-29 NOTE — ED ADULT NURSE NOTE - CHIEF COMPLAINT QUOTE
Patient presents from home reports near syncopal episode from home, reports he had associated shortness of breath and lethargy. Had stent placed last month. Complains of crazy tingling in both his legs and profuse sweating.

## 2018-10-29 NOTE — ED ADULT TRIAGE NOTE - CHIEF COMPLAINT QUOTE
Patient presents from home reports near syncopal episode from home, reports he had associated shortness of breath and lethargy. Had stent placed last month

## 2018-10-29 NOTE — ED PROVIDER NOTE - PMH
CAD (coronary artery disease)    Enlarged prostate    HTN (hypertension)    Low testosterone in male    Stented coronary artery

## 2020-03-29 NOTE — ED PROVIDER NOTE - SEVERITY
PAIN SCALE 0 OF 10.
I have personally seen and examined this patient.  I have fully participated in the care of this patient. I have reviewed all pertinent clinical information, including history, physical exam, plan and the Resident’s note and agree except as noted.

## 2021-07-11 ENCOUNTER — TRANSCRIPTION ENCOUNTER (OUTPATIENT)
Age: 61
End: 2021-07-11

## 2023-06-21 NOTE — ED PROVIDER NOTE - MEDICAL DECISION MAKING DETAILS
56 y/o m with CP, usually on exertion, today at rest, ongoing x4 days, will obtain labs, imaging, EKG, admit for further workup. Pt received 2 ASA PTA. Ndc (300 Mg Prefilled Syringe): [address]-16 Ndc (300 Mg Prefilled Syringe): [address]-37

## 2023-06-29 NOTE — ED ADULT NURSE NOTE - NSIMPLEMENTINTERV_GEN_ALL_ED
Implemented All Universal Safety Interventions:  [place] to call system. Call bell, personal items and telephone within reach. Instruct patient to call for assistance. Room bathroom lighting operational. Non-slip footwear when patient is off stretcher. Physically safe environment: no spills, clutter or unnecessary equipment. Stretcher in lowest position, wheels locked, appropriate side rails in place.
DISPLAY PLAN FREE TEXT

## 2023-07-26 NOTE — ED PROVIDER NOTE - CHIEF COMPLAINT
Name: Yadira Kaba      : 1959      MRN: [id number]  Encounter Provider: Moreno Gorman MD  Encounter Date: 2023   Encounter department: [facility] PRIMARY [facility]    Assessment & Plan     1. Essential hypertension  Assessment & Plan:  BP  Stable off  meds      2. Osteoporosis without current pathological fracture, unspecified osteoporosis type  -     alendronate (FOSAMAX) 70 mg tablet; Take 1 tablet (70 mg total) by mouth every 7 days  -     DXA bone density spine hip and pelvis; Future; Expected date: 2023    3. Mixed hyperlipidemia  Assessment & Plan:  Refill crestor, last lab  good    Orders:  -     rosuvastatin (CRESTOR) 10 MG tablet; Take 1 tablet (10 mg total) by mouth daily  -     Lipid panel; Future; Expected date: 2023  -     Comprehensive metabolic panel; Future; Expected date: 2023    4. Screening for prostate cancer  -     PSA, Total Screen; Future; Expected date: 2023         Subjective      Here  For  F/u htn, lipids, osteoporosis, feels well  , no cp, no sob, occ  headaches    Review of Systems   Constitutional: Negative for activity change, appetite change and fatigue. Respiratory: Negative for shortness of breath. Cardiovascular: Negative for chest pain. Neurological: Positive for headaches. Negative for dizziness and seizures. Psychiatric/Behavioral: The patient is not nervous/anxious.         Current Outpatient Medications on File Prior to Visit   Medication Sig   • aspirin-dipyridamole (AGGRENOX)  mg per 12 hr capsule TAKE 1 CAPSULE BY MOUTH TWICE A DAY   • Calcium-Magnesium-Vitamin D (CALCIUM MAGNESIUM PO) Take by mouth   • carBAMazepine (TEGretol) 200 mg tablet TAKE 3 TABLETS IN THE AM, 1 TABLETS AT LUNCH, 2 TABLETS AT DINNER, AND 2 TABLETS AT BEDTIME   • co-enzyme Q-10 30 MG capsule Take 100 mg by mouth daily     • GaviLyte-G 236 g solution Take as directed   • LORazepam (ATIVAN) 0.5 mg tablet TAKE 1 TABLET BY MOUTH ONCE DAILY IF The patient is a 58y Male complaining of syncope. NEEDED for seizure. Limit of 1 in 24 hours   • NON FORMULARY Super Beets   • VALERIAN ROOT PO Use   • Vitamin Mixture (VITAMIN E COMPLETE PO) Take 1 tablet by mouth daily    • [DISCONTINUED] alendronate (FOSAMAX) 70 mg tablet Take 1 tablet (70 mg total) by mouth every 7 days   • [DISCONTINUED] atorvastatin (LIPITOR) 40 mg tablet Take 1 tablet by mouth every evening   • [DISCONTINUED] rosuvastatin (CRESTOR) 10 MG tablet Take 1 tablet (10 mg total) by mouth daily       Objective     /76 (BP Location: Right arm, Patient Position: Sitting, Cuff Size: Standard)   Pulse 85   Ht 5' 10" (1.778 m)   Wt 73.9 kg (163 lb)   SpO2 96%   BMI 23.39 kg/m²     Physical Exam  Vitals reviewed. Constitutional:       Appearance: Normal appearance. Neck:      Vascular: No carotid bruit. Cardiovascular:      Rate and Rhythm: Normal rate and regular rhythm. Pulses: Normal pulses. Heart sounds: Normal heart sounds. Pulmonary:      Effort: Pulmonary effort is normal.      Breath sounds: Normal breath sounds. Musculoskeletal:      Right lower leg: No edema. Left lower leg: No edema. Lymphadenopathy:      Cervical: No cervical adenopathy. Neurological:      Mental Status: He is alert.    Psychiatric:         Mood and Affect: Mood normal.       Megha Gallo MD

## 2024-06-05 NOTE — ED PROVIDER NOTE - MUSCULOSKELETAL, MLM
Spine appears normal, range of motion is not limited, no muscle or joint tenderness. no calf tenderness Statement Selected

## 2024-07-17 ENCOUNTER — APPOINTMENT (OUTPATIENT)
Dept: ORTHOPEDIC SURGERY | Facility: CLINIC | Age: 64
End: 2024-07-17

## 2024-07-17 VITALS
SYSTOLIC BLOOD PRESSURE: 166 MMHG | WEIGHT: 220 LBS | DIASTOLIC BLOOD PRESSURE: 114 MMHG | HEIGHT: 70 IN | HEART RATE: 77 BPM | BODY MASS INDEX: 31.5 KG/M2

## 2024-07-17 DIAGNOSIS — Z86.79 PERSONAL HISTORY OF OTHER DISEASES OF THE CIRCULATORY SYSTEM: ICD-10-CM

## 2024-07-17 DIAGNOSIS — Z80.9 FAMILY HISTORY OF MALIGNANT NEOPLASM, UNSPECIFIED: ICD-10-CM

## 2024-07-17 DIAGNOSIS — Z86.39 PERSONAL HISTORY OF OTHER ENDOCRINE, NUTRITIONAL AND METABOLIC DISEASE: ICD-10-CM

## 2024-07-17 DIAGNOSIS — M17.11 UNILATERAL PRIMARY OSTEOARTHRITIS, RIGHT KNEE: ICD-10-CM

## 2024-07-17 DIAGNOSIS — R79.89 OTHER SPECIFIED ABNORMAL FINDINGS OF BLOOD CHEMISTRY: ICD-10-CM

## 2024-07-17 PROCEDURE — 73560 X-RAY EXAM OF KNEE 1 OR 2: CPT | Mod: RT

## 2024-07-17 PROCEDURE — 99203 OFFICE O/P NEW LOW 30 MIN: CPT

## 2024-07-22 PROBLEM — Z86.79 HISTORY OF CARDIAC DISORDER: Status: RESOLVED | Noted: 2024-07-22 | Resolved: 2024-07-22

## 2024-07-22 PROBLEM — R79.89 LOW TESTOSTERONE: Status: RESOLVED | Noted: 2024-07-22 | Resolved: 2024-07-22

## 2024-07-22 PROBLEM — Z86.79 HISTORY OF HYPERTENSION: Status: RESOLVED | Noted: 2024-07-22 | Resolved: 2024-07-22

## 2024-07-22 PROBLEM — Z80.9 FAMILY HISTORY OF MALIGNANT NEOPLASM: Status: ACTIVE | Noted: 2024-07-22

## 2024-07-22 PROBLEM — Z86.39 HISTORY OF HIGH CHOLESTEROL: Status: RESOLVED | Noted: 2024-07-22 | Resolved: 2024-07-22

## 2024-07-25 PROBLEM — M17.11 PRIMARY OSTEOARTHRITIS OF RIGHT KNEE: Status: ACTIVE | Noted: 2024-07-17

## 2024-08-19 ENCOUNTER — APPOINTMENT (OUTPATIENT)
Dept: ORTHOPEDIC SURGERY | Facility: CLINIC | Age: 64
End: 2024-08-19
Payer: COMMERCIAL

## 2024-08-19 VITALS
BODY MASS INDEX: 31.5 KG/M2 | HEIGHT: 70 IN | DIASTOLIC BLOOD PRESSURE: 103 MMHG | WEIGHT: 220 LBS | HEART RATE: 77 BPM | SYSTOLIC BLOOD PRESSURE: 159 MMHG

## 2024-08-19 DIAGNOSIS — M17.11 UNILATERAL PRIMARY OSTEOARTHRITIS, RIGHT KNEE: ICD-10-CM

## 2024-08-19 PROCEDURE — 99212 OFFICE O/P EST SF 10 MIN: CPT

## 2024-08-20 NOTE — HISTORY OF PRESENT ILLNESS
[de-identified] : The patient comes in today for his right knee. He states he is feeling much better. He states his pain is down to a level 1.

## 2024-08-20 NOTE — PHYSICAL EXAM
[de-identified] : Right Knee:  Range of Motion in Degrees      Claimant: Normal:  Flexion Active  135    135-degrees  Flexion Passive  135   135-degrees  Extension Active  0-5   0-5-degrees  Extension Passive  0-5   0-5-degrees   No weakness to flexion/extension. No evidence of instability in the AP plane or varus or valgus stress. Negative Lachman. Negative pivot shift. Negative anterior drawer test. Negative posterior drawer test. Negative Lenka. Negative Apley grind. No medial or lateral joint line tenderness. Positive tenderness over the medial and lateral facet of the patella. Positive patellofemoral crepitations. No lateral tilting patella. No patella apprehension. Positive crepitation in the medial and lateral femoral condyle. No proximal or distal swelling, edema or tenderness. No gross motor or sensory deficits. Mild intra-articular swelling. 2+ DP and PT pulses. No varus or valgus malalignment. Skin is intact. No rashes, scars or lesions.   [de-identified] : Ambulating with a slightly antalgic to antalgic gait.  Station:  Normal.  [de-identified] : Appearance:  Well-developed, well-nourished male in no acute distress.

## 2024-08-20 NOTE — PHYSICAL EXAM
[de-identified] : Right Knee:  Range of Motion in Degrees      Claimant: Normal:  Flexion Active  135    135-degrees  Flexion Passive  135   135-degrees  Extension Active  0-5   0-5-degrees  Extension Passive  0-5   0-5-degrees   No weakness to flexion/extension. No evidence of instability in the AP plane or varus or valgus stress. Negative Lachman. Negative pivot shift. Negative anterior drawer test. Negative posterior drawer test. Negative Lenka. Negative Apley grind. No medial or lateral joint line tenderness. Positive tenderness over the medial and lateral facet of the patella. Positive patellofemoral crepitations. No lateral tilting patella. No patella apprehension. Positive crepitation in the medial and lateral femoral condyle. No proximal or distal swelling, edema or tenderness. No gross motor or sensory deficits. Mild intra-articular swelling. 2+ DP and PT pulses. No varus or valgus malalignment. Skin is intact. No rashes, scars or lesions.   [de-identified] : Ambulating with a slightly antalgic to antalgic gait.  Station:  Normal.  [de-identified] : Appearance:  Well-developed, well-nourished male in no acute distress.

## 2024-08-20 NOTE — DISCUSSION/SUMMARY
[de-identified] : At this time, due to osteoarthritis of the right knee, he is instructed on home therapeutic modalities. He will follow up with me on an as needed basis.

## 2024-08-20 NOTE — ADDENDUM
[FreeTextEntry1] : This note was written by Tali Sanford on 08/20/2024 acting as a scribe for RITA DE LA FUENTE III, MD

## 2024-08-20 NOTE — HISTORY OF PRESENT ILLNESS
[de-identified] : The patient comes in today for his right knee. He states he is feeling much better. He states his pain is down to a level 1.

## 2024-08-20 NOTE — DISCUSSION/SUMMARY
[de-identified] : At this time, due to osteoarthritis of the right knee, he is instructed on home therapeutic modalities. He will follow up with me on an as needed basis.